# Patient Record
Sex: MALE | Race: OTHER | NOT HISPANIC OR LATINO | URBAN - METROPOLITAN AREA
[De-identification: names, ages, dates, MRNs, and addresses within clinical notes are randomized per-mention and may not be internally consistent; named-entity substitution may affect disease eponyms.]

---
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
## 2018-01-25 NOTE — H&P ADULT - FAMILY HISTORY
Sibling  Still living? No  Myocardial infarction, Age at diagnosis: Age Unknown Sibling  Still living? No  Myocardial infarction, Age at diagnosis: Age Unknown  Family history of coronary artery disease, Age at diagnosis: Age Unknown     Father  Still living? Unknown  Family history of coronary artery disease, Age at diagnosis: Age Unknown     Grandparent  Still living? Unknown  Family history of coronary artery disease, Age at diagnosis: Age Unknown

## 2018-01-26 RX ORDER — MONTELUKAST 4 MG/1
1 TABLET, CHEWABLE ORAL
Qty: 1 | Refills: 0 | OUTPATIENT
Start: 2018-01-26 | End: 2018-01-26

## 2018-01-26 RX ORDER — ISOSORBIDE MONONITRATE 60 MG/1
1 TABLET, EXTENDED RELEASE ORAL
Qty: 0 | Refills: 0 | COMMUNITY

## 2018-01-29 ENCOUNTER — INPATIENT (INPATIENT)
Facility: HOSPITAL | Age: 63
LOS: 0 days | Discharge: HOME | DRG: 247 | End: 2018-01-30
Attending: INTERNAL MEDICINE | Admitting: INTERNAL MEDICINE
Payer: COMMERCIAL

## 2018-01-29 VITALS
DIASTOLIC BLOOD PRESSURE: 95 MMHG | WEIGHT: 165.35 LBS | OXYGEN SATURATION: 99 % | HEART RATE: 86 BPM | TEMPERATURE: 98 F | SYSTOLIC BLOOD PRESSURE: 166 MMHG | RESPIRATION RATE: 27 BRPM | HEIGHT: 64 IN

## 2018-01-29 DIAGNOSIS — R63.8 OTHER SYMPTOMS AND SIGNS CONCERNING FOOD AND FLUID INTAKE: ICD-10-CM

## 2018-01-29 DIAGNOSIS — Z29.9 ENCOUNTER FOR PROPHYLACTIC MEASURES, UNSPECIFIED: ICD-10-CM

## 2018-01-29 DIAGNOSIS — S06.9X9A UNSPECIFIED INTRACRANIAL INJURY WITH LOSS OF CONSCIOUSNESS OF UNSPECIFIED DURATION, INITIAL ENCOUNTER: ICD-10-CM

## 2018-01-29 DIAGNOSIS — H18.419 ARCUS SENILIS, UNSPECIFIED EYE: ICD-10-CM

## 2018-01-29 DIAGNOSIS — E78.5 HYPERLIPIDEMIA, UNSPECIFIED: ICD-10-CM

## 2018-01-29 DIAGNOSIS — Z88.8 ALLERGY STATUS TO OTHER DRUGS, MEDICAMENTS AND BIOLOGICAL SUBSTANCES STATUS: ICD-10-CM

## 2018-01-29 DIAGNOSIS — I10 ESSENTIAL (PRIMARY) HYPERTENSION: ICD-10-CM

## 2018-01-29 LAB
ALBUMIN SERPL ELPH-MCNC: 4.4 G/DL — SIGNIFICANT CHANGE UP (ref 3.3–5)
ALP SERPL-CCNC: 55 U/L — SIGNIFICANT CHANGE UP (ref 40–120)
ALT FLD-CCNC: 28 U/L — SIGNIFICANT CHANGE UP (ref 10–45)
ANION GAP SERPL CALC-SCNC: 13 MMOL/L — SIGNIFICANT CHANGE UP (ref 5–17)
APPEARANCE UR: CLEAR — SIGNIFICANT CHANGE UP
APTT BLD: 28.3 SEC — SIGNIFICANT CHANGE UP (ref 27.5–37.4)
APTT BLD: 35.1 SEC — SIGNIFICANT CHANGE UP (ref 27.5–37.4)
APTT BLD: >200 SEC — CRITICAL HIGH (ref 27.5–37.4)
AST SERPL-CCNC: 21 U/L — SIGNIFICANT CHANGE UP (ref 10–40)
BILIRUB SERPL-MCNC: 0.5 MG/DL — SIGNIFICANT CHANGE UP (ref 0.2–1.2)
BILIRUB UR-MCNC: NEGATIVE — SIGNIFICANT CHANGE UP
BLD GP AB SCN SERPL QL: NEGATIVE — SIGNIFICANT CHANGE UP
BLD GP AB SCN SERPL QL: NEGATIVE — SIGNIFICANT CHANGE UP
BUN SERPL-MCNC: 16 MG/DL — SIGNIFICANT CHANGE UP (ref 7–23)
CALCIUM SERPL-MCNC: 9 MG/DL — SIGNIFICANT CHANGE UP (ref 8.4–10.5)
CHLORIDE SERPL-SCNC: 102 MMOL/L — SIGNIFICANT CHANGE UP (ref 96–108)
CHOLEST SERPL-MCNC: 145 MG/DL — SIGNIFICANT CHANGE UP (ref 10–199)
CO2 SERPL-SCNC: 23 MMOL/L — SIGNIFICANT CHANGE UP (ref 22–31)
COLOR SPEC: YELLOW — SIGNIFICANT CHANGE UP
CREAT SERPL-MCNC: 1.11 MG/DL — SIGNIFICANT CHANGE UP (ref 0.5–1.3)
DIFF PNL FLD: NEGATIVE — SIGNIFICANT CHANGE UP
GLUCOSE SERPL-MCNC: 98 MG/DL — SIGNIFICANT CHANGE UP (ref 70–99)
GLUCOSE UR QL: NEGATIVE — SIGNIFICANT CHANGE UP
HBA1C BLD-MCNC: 5 % — SIGNIFICANT CHANGE UP (ref 4–5.6)
HCT VFR BLD CALC: 39.9 % — SIGNIFICANT CHANGE UP (ref 39–50)
HDLC SERPL-MCNC: 64 MG/DL — SIGNIFICANT CHANGE UP (ref 40–125)
HGB BLD-MCNC: 13.9 G/DL — SIGNIFICANT CHANGE UP (ref 13–17)
INR BLD: 0.99 — SIGNIFICANT CHANGE UP (ref 0.88–1.16)
INR BLD: 1.1 — SIGNIFICANT CHANGE UP (ref 0.88–1.16)
KETONES UR-MCNC: NEGATIVE — SIGNIFICANT CHANGE UP
LEUKOCYTE ESTERASE UR-ACNC: NEGATIVE — SIGNIFICANT CHANGE UP
LIPID PNL WITH DIRECT LDL SERPL: 64 MG/DL — SIGNIFICANT CHANGE UP
MAGNESIUM SERPL-MCNC: 2.1 MG/DL — SIGNIFICANT CHANGE UP (ref 1.6–2.6)
MCHC RBC-ENTMCNC: 32 PG — SIGNIFICANT CHANGE UP (ref 27–34)
MCHC RBC-ENTMCNC: 34.8 G/DL — SIGNIFICANT CHANGE UP (ref 32–36)
MCV RBC AUTO: 91.9 FL — SIGNIFICANT CHANGE UP (ref 80–100)
NITRITE UR-MCNC: NEGATIVE — SIGNIFICANT CHANGE UP
PH UR: 7 — SIGNIFICANT CHANGE UP (ref 5–8)
PHOSPHATE SERPL-MCNC: 3.1 MG/DL — SIGNIFICANT CHANGE UP (ref 2.5–4.5)
PLATELET # BLD AUTO: 233 K/UL — SIGNIFICANT CHANGE UP (ref 150–400)
POTASSIUM SERPL-MCNC: 4.1 MMOL/L — SIGNIFICANT CHANGE UP (ref 3.5–5.3)
POTASSIUM SERPL-SCNC: 4.1 MMOL/L — SIGNIFICANT CHANGE UP (ref 3.5–5.3)
PROT SERPL-MCNC: 7.5 G/DL — SIGNIFICANT CHANGE UP (ref 6–8.3)
PROT UR-MCNC: NEGATIVE MG/DL — SIGNIFICANT CHANGE UP
PROTHROM AB SERPL-ACNC: 11 SEC — SIGNIFICANT CHANGE UP (ref 9.8–12.7)
PROTHROM AB SERPL-ACNC: 12.2 SEC — SIGNIFICANT CHANGE UP (ref 9.8–12.7)
RBC # BLD: 4.34 M/UL — SIGNIFICANT CHANGE UP (ref 4.2–5.8)
RBC # FLD: 11.4 % — SIGNIFICANT CHANGE UP (ref 10.3–16.9)
RH IG SCN BLD-IMP: POSITIVE — SIGNIFICANT CHANGE UP
RH IG SCN BLD-IMP: POSITIVE — SIGNIFICANT CHANGE UP
SODIUM SERPL-SCNC: 138 MMOL/L — SIGNIFICANT CHANGE UP (ref 135–145)
SP GR SPEC: 1.01 — SIGNIFICANT CHANGE UP (ref 1–1.03)
TOTAL CHOLESTEROL/HDL RATIO MEASUREMENT: 2.3 RATIO — LOW (ref 3.4–9.6)
TRIGL SERPL-MCNC: 84 MG/DL — SIGNIFICANT CHANGE UP (ref 10–149)
TSH SERPL-MCNC: 0.76 UIU/ML — SIGNIFICANT CHANGE UP (ref 0.35–4.94)
UROBILINOGEN FLD QL: 0.2 E.U./DL — SIGNIFICANT CHANGE UP
WBC # BLD: 4.7 K/UL — SIGNIFICANT CHANGE UP (ref 3.8–10.5)
WBC # FLD AUTO: 4.7 K/UL — SIGNIFICANT CHANGE UP (ref 3.8–10.5)

## 2018-01-29 PROCEDURE — 99291 CRITICAL CARE FIRST HOUR: CPT

## 2018-01-29 PROCEDURE — 95018 ALL TSTG PERQ&IQ DRUGS/BIOL: CPT | Mod: GC

## 2018-01-29 PROCEDURE — 93010 ELECTROCARDIOGRAM REPORT: CPT

## 2018-01-29 PROCEDURE — 92928 PRQ TCAT PLMT NTRAC ST 1 LES: CPT | Mod: LC

## 2018-01-29 PROCEDURE — 93458 L HRT ARTERY/VENTRICLE ANGIO: CPT | Mod: 26,XU

## 2018-01-29 PROCEDURE — 93571 IV DOP VEL&/PRESS C FLO 1ST: CPT | Mod: 26,LC

## 2018-01-29 RX ORDER — FUROSEMIDE 40 MG
20 TABLET ORAL ONCE
Qty: 0 | Refills: 0 | Status: COMPLETED | OUTPATIENT
Start: 2018-01-29 | End: 2018-01-29

## 2018-01-29 RX ORDER — ISOSORBIDE MONONITRATE 60 MG/1
1 TABLET, EXTENDED RELEASE ORAL
Qty: 0 | Refills: 0 | COMMUNITY

## 2018-01-29 RX ORDER — METOPROLOL TARTRATE 50 MG
12.5 TABLET ORAL
Qty: 0 | Refills: 0 | Status: DISCONTINUED | OUTPATIENT
Start: 2018-01-29 | End: 2018-01-29

## 2018-01-29 RX ORDER — ASPIRIN/CALCIUM CARB/MAGNESIUM 324 MG
81 TABLET ORAL ONCE
Qty: 0 | Refills: 0 | Status: COMPLETED | OUTPATIENT
Start: 2018-01-29 | End: 2018-01-29

## 2018-01-29 RX ORDER — CLOPIDOGREL BISULFATE 75 MG/1
600 TABLET, FILM COATED ORAL ONCE
Qty: 0 | Refills: 0 | Status: COMPLETED | OUTPATIENT
Start: 2018-01-29 | End: 2018-01-29

## 2018-01-29 RX ORDER — ATORVASTATIN CALCIUM 80 MG/1
80 TABLET, FILM COATED ORAL AT BEDTIME
Qty: 0 | Refills: 0 | Status: DISCONTINUED | OUTPATIENT
Start: 2018-01-29 | End: 2018-01-30

## 2018-01-29 RX ORDER — CLOPIDOGREL BISULFATE 75 MG/1
75 TABLET, FILM COATED ORAL DAILY
Qty: 0 | Refills: 0 | Status: DISCONTINUED | OUTPATIENT
Start: 2018-01-30 | End: 2018-01-30

## 2018-01-29 RX ORDER — DIPHENHYDRAMINE HCL 50 MG
50 CAPSULE ORAL ONCE
Qty: 0 | Refills: 0 | Status: DISCONTINUED | OUTPATIENT
Start: 2018-01-29 | End: 2018-01-29

## 2018-01-29 RX ORDER — ASPIRIN/CALCIUM CARB/MAGNESIUM 324 MG
81 TABLET ORAL DAILY
Qty: 0 | Refills: 0 | Status: DISCONTINUED | OUTPATIENT
Start: 2018-01-30 | End: 2018-01-30

## 2018-01-29 RX ORDER — DIPHENHYDRAMINE HCL 50 MG
50 CAPSULE ORAL ONCE
Qty: 0 | Refills: 0 | Status: COMPLETED | OUTPATIENT
Start: 2018-01-29 | End: 2018-01-29

## 2018-01-29 RX ORDER — ATORVASTATIN CALCIUM 80 MG/1
40 TABLET, FILM COATED ORAL AT BEDTIME
Qty: 0 | Refills: 0 | Status: DISCONTINUED | OUTPATIENT
Start: 2018-01-29 | End: 2018-01-29

## 2018-01-29 RX ORDER — METOPROLOL TARTRATE 50 MG
12.5 TABLET ORAL
Qty: 0 | Refills: 0 | Status: DISCONTINUED | OUTPATIENT
Start: 2018-01-30 | End: 2018-01-30

## 2018-01-29 RX ORDER — INFLUENZA VIRUS VACCINE 15; 15; 15; 15 UG/.5ML; UG/.5ML; UG/.5ML; UG/.5ML
0.5 SUSPENSION INTRAMUSCULAR ONCE
Qty: 0 | Refills: 0 | Status: DISCONTINUED | OUTPATIENT
Start: 2018-01-29 | End: 2018-01-30

## 2018-01-29 RX ORDER — EPINEPHRINE 0.3 MG/.3ML
0.3 INJECTION INTRAMUSCULAR; SUBCUTANEOUS ONCE
Qty: 0 | Refills: 0 | Status: DISCONTINUED | OUTPATIENT
Start: 2018-01-29 | End: 2018-01-29

## 2018-01-29 RX ADMIN — ATORVASTATIN CALCIUM 80 MILLIGRAM(S): 80 TABLET, FILM COATED ORAL at 21:31

## 2018-01-29 RX ADMIN — Medication 81 MILLIGRAM(S): at 13:45

## 2018-01-29 RX ADMIN — Medication 20 MILLIGRAM(S): at 22:44

## 2018-01-29 RX ADMIN — Medication 12.5 MILLIGRAM(S): at 22:44

## 2018-01-29 RX ADMIN — CLOPIDOGREL BISULFATE 600 MILLIGRAM(S): 75 TABLET, FILM COATED ORAL at 16:50

## 2018-01-29 NOTE — PROGRESS NOTE ADULT - PROBLEM SELECTOR PLAN 3
The patient's /87  home meds include metoprolol 25mg QD, and imdur ER 15mg  -continue home medications tomorrow, had to hold BB given ASA desensitization

## 2018-01-29 NOTE — PROGRESS NOTE ADULT - PROBLEM SELECTOR PLAN 1
-scheduled for 4PM left cardiac catheterization   -will monitor on telemetry for minimum of 48 hours Patient was to referred to Dr. Feliz Cisse for cardiac evaluation as part of his pre-employment assessment.  Coronary Calcium score 01/10/18 was markedly elevated at 390.  The angiogram portion of the CT was unable to be performed.   -scheduled for 4PM left cardiac catheterization   -will monitor on telemetry for minimum of 48 hours after procedure  -NPO  -coags, t+Sx2, CMP, TSH, HgbA1c, CBC  -c/w statin Patient was to referred to Dr. Feliz Cisse for cardiac evaluation as part of his pre-employment assessment.  Coronary Calcium score 01/10/18 was markedly elevated at 390.  The angiogram portion of the CT was unable to be performed.   -scheduled for 4PM left cardiac catheterization   -NPO  -coags, t+Sx2, CMP, TSH, HgbA1c, CBC  -c/w statin

## 2018-01-29 NOTE — PROGRESS NOTE ADULT - PROBLEM SELECTOR PLAN 2
Patient to undergo aspirin desenitization with Dr. Tommie Ortiz today Patient to undergo aspirin desensitization with Dr. Tommie Ortiz today  -will dose in aliquots of 10mg Q 15 minutes  -if symptoms will give benadryl PO and redose the same dose Patient to undergo aspirin desensitization with Dr. Tommie Ortiz today  -held BB greater than 24 hours  -took pretreatment last night with loratadine and montelukast  -will dose in aliquots of 10mg Q 15 minutes  -if symptoms will give benadryl PO and redose the same dose
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
## 2018-01-29 NOTE — PROGRESS NOTE ADULT - PROBLEM SELECTOR PLAN 7
DVT ppx: SCDs  Dispo: CCU catheterization at 4Pm today  Code status: Full code, discussed with patient at bedside

## 2018-01-29 NOTE — PATIENT PROFILE ADULT. - REASON FOR ADMISSION
Patient was undergoing a routine physical for job clearance and was noted to have calcifications on a cardiac MRI.  Patient traveled from Patient was undergoing a routine physical for job clearance and was noted to have calcifications on a cardiac MRI.  Patient traveled from [facility] and arrived 01/28/2018.

## 2018-01-29 NOTE — PROVIDER CONTACT NOTE (OTHER) - ASSESSMENT
right radial pulse +2, cap refill less than 3 second, skin warm, pulse o2 from same hand 95%  /81 MAP 96 HR 68 RR 15
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
## 2018-01-29 NOTE — PROGRESS NOTE ADULT - PROBLEM SELECTOR PLAN 4
DVT ppx: SCDs  Dispo: CCU catheterization at 4Pm today  Code status: patient on rouvastatin 10mg QD at home  -will likely need to increase dose  -follow-up results of lipid panel and cardiac cath and dose statin appropriately patient on rouvastatin 10mg QD at home  -will likely need to increase dose - starting atorvastatin 40mg daily today  -follow-up results of lipid panel and cardiac cath and dose statin appropriately

## 2018-01-29 NOTE — CHART NOTE - NSCHARTNOTEFT_GEN_A_CORE
ASPIRIN DESENSITIZATION    PATIENT REPORTELY HAS ASA -INDUCED CUTANEOUS DISEASE ONLY.   NO HISTORY OF ASA INDUCED ANYPHYLACTOID OR RESPIRATORY DISEASE (ASTHMA, RHINITIS, POLYPS)  pt has been premedicated with loratidine 10mg x 1 and montelukast 10mg x 1 the night prior  all beta blockers and ace inhibitors have been held for >24hrs      subjective:  currently pt denies:  Increased nasal congestion or stuffiness  Watery, itchy, or red eyes  Frontal headache or sensation of sinus pain  Headache or facial pain/pressure  Cough, wheezing, or “tightness” in the chest         Informed consent was obtained and time-out was performed.    time                  symptoms/signs:  0 min       =   no reaction   with 10mg asa  30 min     =   no reaction with 20mg asa  60 min     = no reaction with  40mg asa  90 min    =  no reaction with 80mg asa  120  min  =  no reaction with 160mg asa  150 min   =   no reaction with 81 mg asa tablet  210 min   = no reaction post tablet     ICU Vital Signs Last 24 Hrs  T(C): 36.7 (29 Jan 2018 13:00), Max: 36.9 (29 Jan 2018 08:35)  T(F): 98.1 (29 Jan 2018 13:00), Max: 98.4 (29 Jan 2018 08:35)  HR: 78 (29 Jan 2018 14:45) (66 - 86)  BP: 162/94 (29 Jan 2018 14:45) (146/91 - 174/98)  BP(mean): 132 (29 Jan 2018 14:45) (109 - 134)  ABP: --  ABP(mean): --  RR: 29 (29 Jan 2018 14:45) (12 - 37)  SpO2: 97% (29 Jan 2018 14:45) (96% - 99%)    PHYSICAL EXAM:  Constitutional: well appearing  Eyes: non injected  ENMT: no lip/tongue or facial edema  Neck: no stridor   Respiratory:cta bl  Cardiovascular: s1 s2 nml, rrr  Gastrointestinal: soft (+) bs nt nd   Extremities: no edema  Skin:no rash, urticaria       Procedure was performed successfully without complications.  * ASPIRIN DESENSITIZATION HAS BEEN SUCCESSFUL.      PATIENT CAN BE STARTED ON ASA,  UP TO 81 MG DAILY  *IF 2 DOSES OF ASA ARE MISSED, PATIENT WILL NEED TO GO THROUGH ASA DESENSITIZATION AGAIN.     Results were explained to the patient and communicated with primary team  Procedure was performed by Dr. Tommie Ortiz  Time face to face 210 minutes with >50% on counseling and coordination of care.

## 2018-01-30 VITALS
DIASTOLIC BLOOD PRESSURE: 61 MMHG | RESPIRATION RATE: 20 BRPM | HEART RATE: 74 BPM | SYSTOLIC BLOOD PRESSURE: 105 MMHG | OXYGEN SATURATION: 97 %

## 2018-01-30 DIAGNOSIS — I25.10 ATHEROSCLEROTIC HEART DISEASE OF NATIVE CORONARY ARTERY WITHOUT ANGINA PECTORIS: ICD-10-CM

## 2018-01-30 LAB
ALBUMIN SERPL ELPH-MCNC: 4.3 G/DL — SIGNIFICANT CHANGE UP (ref 3.3–5)
ALP SERPL-CCNC: 56 U/L — SIGNIFICANT CHANGE UP (ref 40–120)
ALT FLD-CCNC: 28 U/L — SIGNIFICANT CHANGE UP (ref 10–45)
ANION GAP SERPL CALC-SCNC: 14 MMOL/L — SIGNIFICANT CHANGE UP (ref 5–17)
AST SERPL-CCNC: 21 U/L — SIGNIFICANT CHANGE UP (ref 10–40)
BILIRUB SERPL-MCNC: 0.8 MG/DL — SIGNIFICANT CHANGE UP (ref 0.2–1.2)
BUN SERPL-MCNC: 17 MG/DL — SIGNIFICANT CHANGE UP (ref 7–23)
CALCIUM SERPL-MCNC: 9.1 MG/DL — SIGNIFICANT CHANGE UP (ref 8.4–10.5)
CHLORIDE SERPL-SCNC: 99 MMOL/L — SIGNIFICANT CHANGE UP (ref 96–108)
CHOLEST SERPL-MCNC: 148 MG/DL — SIGNIFICANT CHANGE UP (ref 10–199)
CO2 SERPL-SCNC: 24 MMOL/L — SIGNIFICANT CHANGE UP (ref 22–31)
CREAT SERPL-MCNC: 1.12 MG/DL — SIGNIFICANT CHANGE UP (ref 0.5–1.3)
GLUCOSE SERPL-MCNC: 128 MG/DL — HIGH (ref 70–99)
HCT VFR BLD CALC: 42.6 % — SIGNIFICANT CHANGE UP (ref 39–50)
HDLC SERPL-MCNC: 57 MG/DL — SIGNIFICANT CHANGE UP (ref 40–125)
HGB BLD-MCNC: 14.7 G/DL — SIGNIFICANT CHANGE UP (ref 13–17)
LIPID PNL WITH DIRECT LDL SERPL: 73 MG/DL — SIGNIFICANT CHANGE UP
MAGNESIUM SERPL-MCNC: 2.1 MG/DL — SIGNIFICANT CHANGE UP (ref 1.6–2.6)
MCHC RBC-ENTMCNC: 31.5 PG — SIGNIFICANT CHANGE UP (ref 27–34)
MCHC RBC-ENTMCNC: 34.5 G/DL — SIGNIFICANT CHANGE UP (ref 32–36)
MCV RBC AUTO: 91.4 FL — SIGNIFICANT CHANGE UP (ref 80–100)
PHOSPHATE SERPL-MCNC: 3.8 MG/DL — SIGNIFICANT CHANGE UP (ref 2.5–4.5)
PLATELET # BLD AUTO: 243 K/UL — SIGNIFICANT CHANGE UP (ref 150–400)
POTASSIUM SERPL-MCNC: 4.3 MMOL/L — SIGNIFICANT CHANGE UP (ref 3.5–5.3)
POTASSIUM SERPL-SCNC: 4.3 MMOL/L — SIGNIFICANT CHANGE UP (ref 3.5–5.3)
PROT SERPL-MCNC: 7.8 G/DL — SIGNIFICANT CHANGE UP (ref 6–8.3)
RBC # BLD: 4.66 M/UL — SIGNIFICANT CHANGE UP (ref 4.2–5.8)
RBC # FLD: 11.5 % — SIGNIFICANT CHANGE UP (ref 10.3–16.9)
SODIUM SERPL-SCNC: 137 MMOL/L — SIGNIFICANT CHANGE UP (ref 135–145)
TOTAL CHOLESTEROL/HDL RATIO MEASUREMENT: 2.6 RATIO — LOW (ref 3.4–9.6)
TRIGL SERPL-MCNC: 91 MG/DL — SIGNIFICANT CHANGE UP (ref 10–149)
WBC # BLD: 8.5 K/UL — SIGNIFICANT CHANGE UP (ref 3.8–10.5)
WBC # FLD AUTO: 8.5 K/UL — SIGNIFICANT CHANGE UP (ref 3.8–10.5)

## 2018-01-30 PROCEDURE — 80061 LIPID PANEL: CPT

## 2018-01-30 PROCEDURE — 71045 X-RAY EXAM CHEST 1 VIEW: CPT | Mod: 26

## 2018-01-30 PROCEDURE — 86901 BLOOD TYPING SEROLOGIC RH(D): CPT

## 2018-01-30 PROCEDURE — C1769: CPT

## 2018-01-30 PROCEDURE — 84100 ASSAY OF PHOSPHORUS: CPT

## 2018-01-30 PROCEDURE — 99239 HOSP IP/OBS DSCHRG MGMT >30: CPT

## 2018-01-30 PROCEDURE — 71045 X-RAY EXAM CHEST 1 VIEW: CPT

## 2018-01-30 PROCEDURE — C1887: CPT

## 2018-01-30 PROCEDURE — 85027 COMPLETE CBC AUTOMATED: CPT

## 2018-01-30 PROCEDURE — 83735 ASSAY OF MAGNESIUM: CPT

## 2018-01-30 PROCEDURE — 93005 ELECTROCARDIOGRAM TRACING: CPT

## 2018-01-30 PROCEDURE — 85610 PROTHROMBIN TIME: CPT

## 2018-01-30 PROCEDURE — 86900 BLOOD TYPING SEROLOGIC ABO: CPT

## 2018-01-30 PROCEDURE — 86850 RBC ANTIBODY SCREEN: CPT

## 2018-01-30 PROCEDURE — C1725: CPT

## 2018-01-30 PROCEDURE — 80053 COMPREHEN METABOLIC PANEL: CPT

## 2018-01-30 PROCEDURE — 85730 THROMBOPLASTIN TIME PARTIAL: CPT

## 2018-01-30 PROCEDURE — 93306 TTE W/DOPPLER COMPLETE: CPT

## 2018-01-30 PROCEDURE — 81003 URINALYSIS AUTO W/O SCOPE: CPT

## 2018-01-30 PROCEDURE — 93306 TTE W/DOPPLER COMPLETE: CPT | Mod: 26

## 2018-01-30 PROCEDURE — C1889: CPT

## 2018-01-30 PROCEDURE — 83036 HEMOGLOBIN GLYCOSYLATED A1C: CPT

## 2018-01-30 PROCEDURE — 36415 COLL VENOUS BLD VENIPUNCTURE: CPT

## 2018-01-30 PROCEDURE — 84443 ASSAY THYROID STIM HORMONE: CPT

## 2018-01-30 PROCEDURE — 97161 PT EVAL LOW COMPLEX 20 MIN: CPT

## 2018-01-30 PROCEDURE — C1874: CPT

## 2018-01-30 RX ORDER — LISINOPRIL 2.5 MG/1
1 TABLET ORAL
Qty: 30 | Refills: 0 | OUTPATIENT
Start: 2018-01-30 | End: 2018-02-28

## 2018-01-30 RX ORDER — ROSUVASTATIN CALCIUM 5 MG/1
1 TABLET ORAL
Qty: 0 | Refills: 0 | COMMUNITY

## 2018-01-30 RX ORDER — ISOSORBIDE MONONITRATE 60 MG/1
0.25 TABLET, EXTENDED RELEASE ORAL
Qty: 0 | Refills: 0 | COMMUNITY

## 2018-01-30 RX ORDER — CLOPIDOGREL BISULFATE 75 MG/1
1 TABLET, FILM COATED ORAL
Qty: 30 | Refills: 0 | OUTPATIENT
Start: 2018-01-30 | End: 2018-02-28

## 2018-01-30 RX ORDER — ASPIRIN/CALCIUM CARB/MAGNESIUM 324 MG
1 TABLET ORAL
Qty: 30 | Refills: 0 | OUTPATIENT
Start: 2018-01-30 | End: 2018-02-28

## 2018-01-30 RX ORDER — ATORVASTATIN CALCIUM 80 MG/1
1 TABLET, FILM COATED ORAL
Qty: 30 | Refills: 0 | OUTPATIENT
Start: 2018-01-30 | End: 2018-02-28

## 2018-01-30 RX ORDER — METOPROLOL TARTRATE 50 MG
1 TABLET ORAL
Qty: 30 | Refills: 0 | OUTPATIENT
Start: 2018-01-30 | End: 2018-02-28

## 2018-01-30 RX ORDER — METOPROLOL TARTRATE 50 MG
1 TABLET ORAL
Qty: 0 | Refills: 0 | COMMUNITY

## 2018-01-30 RX ORDER — LISINOPRIL 2.5 MG/1
5 TABLET ORAL DAILY
Qty: 0 | Refills: 0 | Status: DISCONTINUED | OUTPATIENT
Start: 2018-01-30 | End: 2018-01-30

## 2018-01-30 RX ADMIN — Medication 81 MILLIGRAM(S): at 12:39

## 2018-01-30 RX ADMIN — CLOPIDOGREL BISULFATE 75 MILLIGRAM(S): 75 TABLET, FILM COATED ORAL at 12:39

## 2018-01-30 RX ADMIN — LISINOPRIL 5 MILLIGRAM(S): 2.5 TABLET ORAL at 10:38

## 2018-01-30 NOTE — DISCHARGE NOTE ADULT - PATIENT PORTAL LINK FT
“You can access the [employer] Patient Portal, offered by [facility], by registering with the following website: [email or web address]”
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
## 2018-01-30 NOTE — DISCHARGE NOTE ADULT - PLAN OF CARE
continue medications and follow-up You had a drug eluting stent placed in your distal circumflex artery. You should continue aspirin 81 daily, plavix 75mg daily, atorvastatin 80mg daily, lisinopril 5mg daily, metoprolol 25mg daily. You had a reaction to aspirin in the past and underwent aspirin desensitization at the hospital before your cardiac catheterization. You should continue atorvastatin 80mg daily. You should continue with metoprolol 25mg daily. You should continue with lisinopril 5mg daily. You had a drug eluting stent placed in your distal circumflex artery. You should continue aspirin 81 daily, plavix 75mg daily, atorvastatin 80mg daily, lisinopril 5mg daily, metoprolol 25mg daily. You should follow-up with Dr. Cisse upon discharge. You should follow up with your primary care provider upon return to [facility]. You had a drug eluting stent placed in your distal circumflex artery. You should continue aspirin 81 daily, plavix 75mg daily, atorvastatin 80mg daily, lisinopril 5mg daily, metoprolol 25mg daily. You should follow-up with Dr. Cisse upon discharge. You should follow up with your primary care provider upon return to [facility]. You underwent echocardiogram that showed normal ejection fraction.

## 2018-01-30 NOTE — PHYSICAL THERAPY INITIAL EVALUATION ADULT - PERTINENT HX OF CURRENT PROBLEM, REHAB EVAL
62 year old male presented for diagnostic left heart catheterization from [facility] because of abnormal calcium score and strong family history of early MI. The patient underwent left heart cath and a JUAN was placed in L distal circumflex.

## 2018-01-30 NOTE — PROGRESS NOTE ADULT - PROBLEM SELECTOR PLAN 7
DVT ppx: SCDs  Dispo: CCU stepdown to telemetry  Code status: Full code, discussed with patient at bedside DVT ppx: SCDs  Dispo: Discharge after echo  Code status: Full code, discussed with patient at bedside

## 2018-01-30 NOTE — DISCHARGE NOTE ADULT - PROVIDER TOKENS
FREE:[LAST:[Paul],FIRST:[Armand],PHONE:[(   )    -],FAX:[(   )    -],ADDRESS:[Armand Miller M.D., Dr, ([facility])  Phone: [phone] |   [address], [facility], [facility]]],TOKEN:'[id number]'

## 2018-01-30 NOTE — DISCHARGE NOTE ADULT - CARE PROVIDER_API CALL
Armand Miller Brent M.D. Dr, ([facility])  Phone: [phone] |   [address] [facility]  Phone: (   )    -  Fax: (   )    -    Feliz Cisse), Cardiology; Interventional Cardiology  [address] [id number]  Phone: [phone]  Fax: [phone]
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
## 2018-01-30 NOTE — DISCHARGE NOTE ADULT - REASON FOR ADMISSION
Patient was undergoing a routine physical for job clearance and was noted to have calcifications on a cardiac MRI.  Patient traveled from [facility] and arrived 01/28/2018.

## 2018-01-30 NOTE — PROGRESS NOTE ADULT - PROBLEM SELECTOR PLAN 6
past diagnosis, can be associated with HLD  stable
past diagnosis, can be associated with HLD  stable

## 2018-01-30 NOTE — PROGRESS NOTE ADULT - PROBLEM SELECTOR PLAN 4
patient on rouvastatin 10mg QD at home  -now increasing to atorvastatin 80mg given CAD s/p stent to L distal circ
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
## 2018-01-30 NOTE — DISCHARGE NOTE ADULT - MEDICATION SUMMARY - MEDICATIONS TO TAKE
I will START or STAY ON the medications listed below when I get home from the hospital:    aspirin 81 mg oral delayed release tablet  -- 1 tab(s) by mouth once a day  -- Indication: For CAD    lisinopril 5 mg oral tablet  -- 1 tab(s) by mouth once a day  -- Indication: For HTN    atorvastatin 80 mg oral tablet  -- 1 tab(s) by mouth once a day (at bedtime)  -- Indication: For HLD    clopidogrel 75 mg oral tablet  -- 1 tab(s) by mouth once a day  -- Indication: For CAD    Metoprolol Succinate ER 25 mg oral tablet, extended release  -- 1 tab(s) by mouth once a day  -- Indication: For HTN

## 2018-01-30 NOTE — PROGRESS NOTE ADULT - PROBLEM SELECTOR PLAN 8
F: no IVF  E: replete electrolytes PRN  N: DASH/TLC diet
F: no IVF  E: replete electrolytes PRN  N: NPO for cardiac cath at 4PM

## 2018-01-30 NOTE — PROGRESS NOTE ADULT - PROBLEM SELECTOR PLAN 2
Patient to underwent aspirin desensitization with Dr. Tommie Ortiz 1/29/18,   -held BB greater than 24 hours before ASA desensitization, now restarted   -received 381 total ASA 1/29/18 with no symptoms, now dosing ASA 81 mg daily  -patient understands he cannot miss doses or may need to be desensitized again if doses are missed

## 2018-01-30 NOTE — PROGRESS NOTE ADULT - PROBLEM SELECTOR PLAN 1
Patient was to referred to Dr. Feliz Cisse for cardiac evaluation as part of his pre-employment assessment.  Coronary Calcium score 01/10/18 was markedly elevated at 390.  The angiogram portion of the CT was unable to be performed.   -underwent 1/29 4PM left cardiac catheterization, s/p JUAN to left distal cricumflex  -will monitor on telemetry for minimum of 48 hours after procedure  -c/w statin  -loaded with 600 plavix, c/w 75mg daily  -underwent ASA desentization with Tommie Ortiz 1/29 receiving total of 381 ASA, now dose 81 ASA daily  -DASH/TLC diet  -metoprolol 12.5 BID, uptitrate as tolerated Patient was to referred to Dr. Feliz Cisse for cardiac evaluation as part of his pre-employment assessment.  Coronary Calcium score 01/10/18 was markedly elevated at 390.  The angiogram portion of the CT was unable to be performed.   -underwent 1/29 4PM left cardiac catheterization, s/p JUAN to left distal circumflex  -as this was elective cath, patient stable for discharge today  -c/w statin  -loaded with 600 plavix, c/w 75mg daily  -underwent ASA desentization with Tommie Ortiz 1/29 receiving total of 381 ASA, now dose 81 ASA daily  -DASH/TLC diet  -metoprolol 12.5 BID, uptitrate as tolerated as outpatient  -Echocardiogram  -initiate lisinopril 5mg daily
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
## 2018-01-30 NOTE — PROGRESS NOTE ADULT - PROBLEM SELECTOR PLAN 3
The patient's /87  home meds include metoprolol 25mg QD, and imdur ER 15mg  -continue metoprolol and uptitrate as allowed The patient's /87  home meds include metoprolol 25mg QD, and imdur ER 15mg  -continue metoprolol and uptitrate as allowed  -start lisinopril 5mg daily

## 2018-01-30 NOTE — DISCHARGE NOTE ADULT - CARE PLAN
Principal Discharge DX:	CAD (coronary artery disease)  Goal:	continue medications and follow-up  Assessment and plan of treatment:	You had a drug eluting stent placed in your distal circumflex artery. You should continue aspirin 81 daily, plavix 75mg daily, atorvastatin 80mg daily, lisinopril 5mg daily, metoprolol 25mg daily.  Secondary Diagnosis:	Aspirin allergy  Assessment and plan of treatment:	You had a reaction to aspirin in the past and underwent aspirin desensitization at the hospital before your cardiac catheterization.  Secondary Diagnosis:	Hyperlipidemia  Assessment and plan of treatment:	You should continue atorvastatin 80mg daily.  Secondary Diagnosis:	Hypertension  Assessment and plan of treatment:	You should continue with metoprolol 25mg daily. You should continue with lisinopril 5mg daily. Principal Discharge DX:	CAD (coronary artery disease)  Goal:	continue medications and follow-up  Assessment and plan of treatment:	You had a drug eluting stent placed in your distal circumflex artery. You should continue aspirin 81 daily, plavix 75mg daily, atorvastatin 80mg daily, lisinopril 5mg daily, metoprolol 25mg daily. You should follow-up with Dr. Cisse upon discharge. You should follow up with your primary care provider upon return to [facility].  Secondary Diagnosis:	Aspirin allergy  Assessment and plan of treatment:	You had a reaction to aspirin in the past and underwent aspirin desensitization at the hospital before your cardiac catheterization.  Secondary Diagnosis:	Hyperlipidemia  Assessment and plan of treatment:	You should continue atorvastatin 80mg daily.  Secondary Diagnosis:	Hypertension  Assessment and plan of treatment:	You should continue with metoprolol 25mg daily. You should continue with lisinopril 5mg daily. Principal Discharge DX:	CAD (coronary artery disease)  Goal:	continue medications and follow-up  Assessment and plan of treatment:	You had a drug eluting stent placed in your distal circumflex artery. You should continue aspirin 81 daily, plavix 75mg daily, atorvastatin 80mg daily, lisinopril 5mg daily, metoprolol 25mg daily. You should follow-up with Dr. Cisse upon discharge. You should follow up with your primary care provider upon return to [facility]. You underwent echocardiogram that showed normal ejection fraction.  Secondary Diagnosis:	Aspirin allergy  Assessment and plan of treatment:	You had a reaction to aspirin in the past and underwent aspirin desensitization at the hospital before your cardiac catheterization.  Secondary Diagnosis:	Hyperlipidemia  Assessment and plan of treatment:	You should continue atorvastatin 80mg daily.  Secondary Diagnosis:	Hypertension  Assessment and plan of treatment:	You should continue with metoprolol 25mg daily. You should continue with lisinopril 5mg daily.
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
## 2018-01-30 NOTE — DISCHARGE NOTE ADULT - MEDICATION SUMMARY - MEDICATIONS TO STOP TAKING
I will STOP taking the medications listed below when I get home from the hospital:    Crestor 10 mg oral tablet  -- 1 tab(s) by mouth once a day (at bedtime)    Imdur 60 mg oral tablet, extended release  -- 0.25 tab(s) by mouth once a day (in the morning)

## 2018-02-02 DIAGNOSIS — I25.10 ATHEROSCLEROTIC HEART DISEASE OF NATIVE CORONARY ARTERY WITHOUT ANGINA PECTORIS: ICD-10-CM

## 2018-02-02 DIAGNOSIS — Z82.49 FAMILY HISTORY OF ISCHEMIC HEART DISEASE AND OTHER DISEASES OF THE CIRCULATORY SYSTEM: ICD-10-CM

## 2018-02-02 DIAGNOSIS — Z87.891 PERSONAL HISTORY OF NICOTINE DEPENDENCE: ICD-10-CM

## 2018-02-02 DIAGNOSIS — I10 ESSENTIAL (PRIMARY) HYPERTENSION: ICD-10-CM

## 2018-02-02 DIAGNOSIS — E78.5 HYPERLIPIDEMIA, UNSPECIFIED: ICD-10-CM

## 2018-02-02 DIAGNOSIS — Z88.6 ALLERGY STATUS TO ANALGESIC AGENT: ICD-10-CM

## 2018-02-02 DIAGNOSIS — Z87.820 PERSONAL HISTORY OF TRAUMATIC BRAIN INJURY: ICD-10-CM
